# Patient Record
Sex: MALE | Race: WHITE
[De-identification: names, ages, dates, MRNs, and addresses within clinical notes are randomized per-mention and may not be internally consistent; named-entity substitution may affect disease eponyms.]

---

## 2018-05-17 ENCOUNTER — HOSPITAL ENCOUNTER (EMERGENCY)
Dept: HOSPITAL 5 - ED | Age: 38
Discharge: HOME | End: 2018-05-17
Payer: COMMERCIAL

## 2018-05-17 VITALS — SYSTOLIC BLOOD PRESSURE: 142 MMHG | DIASTOLIC BLOOD PRESSURE: 95 MMHG

## 2018-05-17 DIAGNOSIS — G44.209: Primary | ICD-10-CM

## 2018-05-17 PROCEDURE — 99283 EMERGENCY DEPT VISIT LOW MDM: CPT

## 2018-05-17 PROCEDURE — 70450 CT HEAD/BRAIN W/O DYE: CPT

## 2018-05-17 NOTE — CAT SCAN REPORT
CT scan of head without IV contrast:



History: Headache.



Findings:



Ventricles are normal in size and midline in location. Moderate volume 

loss. No evidence of acute ischemic, hemorrhage or mass. No extra-axial 

fluid collection. Normal brainstem and cerebellum.



Normal visualized sinuses and mastoid air cells.



Impression:



No acute intracranial abnormality. Moderate volume loss.
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
